# Patient Record
Sex: FEMALE | Race: BLACK OR AFRICAN AMERICAN
[De-identification: names, ages, dates, MRNs, and addresses within clinical notes are randomized per-mention and may not be internally consistent; named-entity substitution may affect disease eponyms.]

---

## 2019-04-12 ENCOUNTER — HOSPITAL ENCOUNTER (EMERGENCY)
Dept: HOSPITAL 72 - EMR | Age: 19
Discharge: HOME | End: 2019-04-12
Payer: MEDICAID

## 2019-04-12 VITALS — DIASTOLIC BLOOD PRESSURE: 61 MMHG | SYSTOLIC BLOOD PRESSURE: 112 MMHG

## 2019-04-12 VITALS — SYSTOLIC BLOOD PRESSURE: 115 MMHG | DIASTOLIC BLOOD PRESSURE: 64 MMHG

## 2019-04-12 VITALS — HEIGHT: 64 IN | WEIGHT: 160 LBS | BODY MASS INDEX: 27.31 KG/M2
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
## 2019-04-12 DIAGNOSIS — J45.909: ICD-10-CM

## 2019-04-12 DIAGNOSIS — R51: Primary | ICD-10-CM
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
## 2019-04-12 PROCEDURE — 99283 EMERGENCY DEPT VISIT LOW MDM: CPT

## 2019-04-12 PROCEDURE — 96372 THER/PROPH/DIAG INJ SC/IM: CPT
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
## 2019-04-12 NOTE — NUR
ER DISCHARGE NOTE:

Patient is cleared to be discharged per Dr. Wei. Pt is A/O x 4 on room air 
with stable vital signs. Pt was given D/C and prescription instructions and was 
able to verbalize understanding.  Pt ID band removed. Pt is able to ambulate 
with steady gait and took all belongings. Accompanied by her Mom.
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
